# Patient Record
Sex: FEMALE | Race: WHITE
[De-identification: names, ages, dates, MRNs, and addresses within clinical notes are randomized per-mention and may not be internally consistent; named-entity substitution may affect disease eponyms.]

---

## 2018-01-09 ENCOUNTER — TRANSCRIPTION ENCOUNTER (OUTPATIENT)
Age: 58
End: 2018-01-09

## 2020-04-26 ENCOUNTER — MESSAGE (OUTPATIENT)
Age: 60
End: 2020-04-26

## 2020-05-12 ENCOUNTER — APPOINTMENT (OUTPATIENT)
Dept: DISASTER EMERGENCY | Facility: CLINIC | Age: 60
End: 2020-05-12

## 2020-05-12 LAB
SARS-COV-2 IGG SERPL IA-ACNC: <3.8 AU/ML
SARS-COV-2 IGG SERPL QL IA: NEGATIVE

## 2020-06-22 ENCOUNTER — EMERGENCY (EMERGENCY)
Facility: HOSPITAL | Age: 60
LOS: 0 days | Discharge: HOME | End: 2020-06-22
Attending: EMERGENCY MEDICINE
Payer: COMMERCIAL

## 2020-06-22 VITALS
HEART RATE: 82 BPM | SYSTOLIC BLOOD PRESSURE: 156 MMHG | DIASTOLIC BLOOD PRESSURE: 78 MMHG | RESPIRATION RATE: 18 BRPM | TEMPERATURE: 99 F | OXYGEN SATURATION: 99 %

## 2020-06-22 VITALS
HEART RATE: 82 BPM | WEIGHT: 199.96 LBS | SYSTOLIC BLOOD PRESSURE: 186 MMHG | TEMPERATURE: 98 F | DIASTOLIC BLOOD PRESSURE: 85 MMHG | RESPIRATION RATE: 17 BRPM | OXYGEN SATURATION: 98 % | HEIGHT: 67 IN

## 2020-06-22 DIAGNOSIS — M47.895 OTHER SPONDYLOSIS, THORACOLUMBAR REGION: ICD-10-CM

## 2020-06-22 DIAGNOSIS — M54.5 LOW BACK PAIN: ICD-10-CM

## 2020-06-22 DIAGNOSIS — K76.0 FATTY (CHANGE OF) LIVER, NOT ELSEWHERE CLASSIFIED: ICD-10-CM

## 2020-06-22 DIAGNOSIS — M48.061 SPINAL STENOSIS, LUMBAR REGION WITHOUT NEUROGENIC CLAUDICATION: ICD-10-CM

## 2020-06-22 DIAGNOSIS — N20.0 CALCULUS OF KIDNEY: ICD-10-CM

## 2020-06-22 DIAGNOSIS — R11.2 NAUSEA WITH VOMITING, UNSPECIFIED: ICD-10-CM

## 2020-06-22 DIAGNOSIS — M54.9 DORSALGIA, UNSPECIFIED: ICD-10-CM

## 2020-06-22 LAB
ALBUMIN SERPL ELPH-MCNC: 4.3 G/DL — SIGNIFICANT CHANGE UP (ref 3.3–5)
ALP SERPL-CCNC: 81 U/L — SIGNIFICANT CHANGE UP (ref 40–120)
ALT FLD-CCNC: 61 U/L — SIGNIFICANT CHANGE UP (ref 12–78)
ANION GAP SERPL CALC-SCNC: 8 MMOL/L — SIGNIFICANT CHANGE UP (ref 5–17)
APPEARANCE UR: CLEAR — SIGNIFICANT CHANGE UP
AST SERPL-CCNC: 44 U/L — HIGH (ref 15–37)
BASOPHILS # BLD AUTO: 0.03 K/UL — SIGNIFICANT CHANGE UP (ref 0–0.2)
BASOPHILS NFR BLD AUTO: 0.4 % — SIGNIFICANT CHANGE UP (ref 0–2)
BILIRUB SERPL-MCNC: 0.6 MG/DL — SIGNIFICANT CHANGE UP (ref 0.2–1.2)
BILIRUB UR-MCNC: NEGATIVE — SIGNIFICANT CHANGE UP
BUN SERPL-MCNC: 21 MG/DL — SIGNIFICANT CHANGE UP (ref 7–23)
CALCIUM SERPL-MCNC: 8.8 MG/DL — SIGNIFICANT CHANGE UP (ref 8.5–10.1)
CHLORIDE SERPL-SCNC: 106 MMOL/L — SIGNIFICANT CHANGE UP (ref 96–108)
CO2 SERPL-SCNC: 22 MMOL/L — SIGNIFICANT CHANGE UP (ref 22–31)
COLOR SPEC: YELLOW — SIGNIFICANT CHANGE UP
CREAT SERPL-MCNC: 0.86 MG/DL — SIGNIFICANT CHANGE UP (ref 0.5–1.3)
DIFF PNL FLD: ABNORMAL
EOSINOPHIL # BLD AUTO: 0.05 K/UL — SIGNIFICANT CHANGE UP (ref 0–0.5)
EOSINOPHIL NFR BLD AUTO: 0.7 % — SIGNIFICANT CHANGE UP (ref 0–6)
GLUCOSE SERPL-MCNC: 193 MG/DL — HIGH (ref 70–99)
GLUCOSE UR QL: 250 MG/DL
HCT VFR BLD CALC: 41.2 % — SIGNIFICANT CHANGE UP (ref 34.5–45)
HGB BLD-MCNC: 14 G/DL — SIGNIFICANT CHANGE UP (ref 11.5–15.5)
IMM GRANULOCYTES NFR BLD AUTO: 0.6 % — SIGNIFICANT CHANGE UP (ref 0–1.5)
KETONES UR-MCNC: ABNORMAL
LEUKOCYTE ESTERASE UR-ACNC: ABNORMAL
LYMPHOCYTES # BLD AUTO: 1.36 K/UL — SIGNIFICANT CHANGE UP (ref 1–3.3)
LYMPHOCYTES # BLD AUTO: 19.6 % — SIGNIFICANT CHANGE UP (ref 13–44)
MCHC RBC-ENTMCNC: 29.9 PG — SIGNIFICANT CHANGE UP (ref 27–34)
MCHC RBC-ENTMCNC: 34 GM/DL — SIGNIFICANT CHANGE UP (ref 32–36)
MCV RBC AUTO: 87.8 FL — SIGNIFICANT CHANGE UP (ref 80–100)
MONOCYTES # BLD AUTO: 0.25 K/UL — SIGNIFICANT CHANGE UP (ref 0–0.9)
MONOCYTES NFR BLD AUTO: 3.6 % — SIGNIFICANT CHANGE UP (ref 2–14)
NEUTROPHILS # BLD AUTO: 5.21 K/UL — SIGNIFICANT CHANGE UP (ref 1.8–7.4)
NEUTROPHILS NFR BLD AUTO: 75.1 % — SIGNIFICANT CHANGE UP (ref 43–77)
NITRITE UR-MCNC: NEGATIVE — SIGNIFICANT CHANGE UP
PH UR: 7 — SIGNIFICANT CHANGE UP (ref 5–8)
PLATELET # BLD AUTO: 205 K/UL — SIGNIFICANT CHANGE UP (ref 150–400)
POTASSIUM SERPL-MCNC: 4.4 MMOL/L — SIGNIFICANT CHANGE UP (ref 3.5–5.3)
POTASSIUM SERPL-SCNC: 4.4 MMOL/L — SIGNIFICANT CHANGE UP (ref 3.5–5.3)
PROT SERPL-MCNC: 8.2 GM/DL — SIGNIFICANT CHANGE UP (ref 6–8.3)
PROT UR-MCNC: 30 MG/DL
RBC # BLD: 4.69 M/UL — SIGNIFICANT CHANGE UP (ref 3.8–5.2)
RBC # FLD: 12.6 % — SIGNIFICANT CHANGE UP (ref 10.3–14.5)
SODIUM SERPL-SCNC: 136 MMOL/L — SIGNIFICANT CHANGE UP (ref 135–145)
SP GR SPEC: 1.01 — SIGNIFICANT CHANGE UP (ref 1.01–1.02)
UROBILINOGEN FLD QL: NEGATIVE MG/DL — SIGNIFICANT CHANGE UP
WBC # BLD: 6.94 K/UL — SIGNIFICANT CHANGE UP (ref 3.8–10.5)
WBC # FLD AUTO: 6.94 K/UL — SIGNIFICANT CHANGE UP (ref 3.8–10.5)

## 2020-06-22 PROCEDURE — 99285 EMERGENCY DEPT VISIT HI MDM: CPT

## 2020-06-22 PROCEDURE — 96375 TX/PRO/DX INJ NEW DRUG ADDON: CPT

## 2020-06-22 PROCEDURE — 74176 CT ABD & PELVIS W/O CONTRAST: CPT

## 2020-06-22 PROCEDURE — 81001 URINALYSIS AUTO W/SCOPE: CPT

## 2020-06-22 PROCEDURE — 74176 CT ABD & PELVIS W/O CONTRAST: CPT | Mod: 26

## 2020-06-22 PROCEDURE — 96376 TX/PRO/DX INJ SAME DRUG ADON: CPT

## 2020-06-22 PROCEDURE — 96374 THER/PROPH/DIAG INJ IV PUSH: CPT

## 2020-06-22 PROCEDURE — 85025 COMPLETE CBC W/AUTO DIFF WBC: CPT

## 2020-06-22 PROCEDURE — 80053 COMPREHEN METABOLIC PANEL: CPT

## 2020-06-22 PROCEDURE — 36415 COLL VENOUS BLD VENIPUNCTURE: CPT

## 2020-06-22 PROCEDURE — 99284 EMERGENCY DEPT VISIT MOD MDM: CPT | Mod: 25

## 2020-06-22 PROCEDURE — 87086 URINE CULTURE/COLONY COUNT: CPT

## 2020-06-22 RX ORDER — ONDANSETRON 8 MG/1
4 TABLET, FILM COATED ORAL ONCE
Refills: 0 | Status: COMPLETED | OUTPATIENT
Start: 2020-06-22 | End: 2020-06-22

## 2020-06-22 RX ORDER — SODIUM CHLORIDE 9 MG/ML
1000 INJECTION INTRAMUSCULAR; INTRAVENOUS; SUBCUTANEOUS ONCE
Refills: 0 | Status: COMPLETED | OUTPATIENT
Start: 2020-06-22 | End: 2020-06-22

## 2020-06-22 RX ORDER — KETOROLAC TROMETHAMINE 30 MG/ML
30 SYRINGE (ML) INJECTION ONCE
Refills: 0 | Status: DISCONTINUED | OUTPATIENT
Start: 2020-06-22 | End: 2020-06-22

## 2020-06-22 RX ORDER — CYCLOBENZAPRINE HYDROCHLORIDE 10 MG/1
1 TABLET, FILM COATED ORAL
Qty: 20 | Refills: 0
Start: 2020-06-22 | End: 2020-06-26

## 2020-06-22 RX ORDER — MORPHINE SULFATE 50 MG/1
4 CAPSULE, EXTENDED RELEASE ORAL ONCE
Refills: 0 | Status: DISCONTINUED | OUTPATIENT
Start: 2020-06-22 | End: 2020-06-22

## 2020-06-22 RX ADMIN — SODIUM CHLORIDE 1000 MILLILITER(S): 9 INJECTION INTRAMUSCULAR; INTRAVENOUS; SUBCUTANEOUS at 09:00

## 2020-06-22 RX ADMIN — ONDANSETRON 4 MILLIGRAM(S): 8 TABLET, FILM COATED ORAL at 10:35

## 2020-06-22 RX ADMIN — Medication 30 MILLIGRAM(S): at 08:59

## 2020-06-22 RX ADMIN — ONDANSETRON 4 MILLIGRAM(S): 8 TABLET, FILM COATED ORAL at 09:00

## 2020-06-22 RX ADMIN — MORPHINE SULFATE 4 MILLIGRAM(S): 50 CAPSULE, EXTENDED RELEASE ORAL at 10:35

## 2020-06-22 NOTE — ED PROVIDER NOTE - CHPI ED SYMPTOMS NEG
-- DO NOT REPLY / DO NOT REPLY ALL --  -- Message is from the [facility]--    Order Request  Immunization / Vaccine - Flu    Message / reason: Patient wants flu vaccine    Insurance type: [employer] HMO [place]  [facility] [place] [facility] [place] / Plan: [place]MO [place] [employer] [employer] [place] / Product Type: [place] Risk           Preferred Delivery Method   Call when ready for pickup - phone number to notify: [phone]     Caller Information       Type Contact Phone    03/07/2020 10:04 AM Phone (Incoming) Eran Ch (Self) [phone] (M)          Alternative phone number: none    Turnaround time given to caller:   \"This message will be sent to [state Provider's name]. The clinical team will fulfill your request as soon as they review your message when the office opens on Monday (or after the holiday).\"  
Left pt a vm to see if she is just coming for a flu shot at the time of her visit or will she be having a cpe that day with ben. Made pt apt for cpe with ben per pt  bradly. Waiting for call back to confirm which apt the pt will be coming in for.  
Pt needs to schedule visit in lab  Please verify that we still have supply of influenza vaccines    
no fever/no dysuria/no burning urination/no hematuria/no chills

## 2020-06-22 NOTE — ED PROVIDER NOTE - CARE PLAN
Principal Discharge DX:	Acute left-sided low back pain without sciatica  Secondary Diagnosis:	Spinal stenosis of lumbar region, unspecified whether neurogenic claudication present

## 2020-06-22 NOTE — ED ADULT NURSE NOTE - OBJECTIVE STATEMENT
PT to ED for c/o left flank pain that began yesterday, and worsened this morning. Denies radiation of pain. Rates pain 10/10. Reports nausea since yesterday and 1 bout of small vomit this morning on the way to the ED. PT a&ox4, ambulatory. Denies chest pain, sob, fever, chills. Denies difficulty and burning w/ urination. PT reports clear yellow urine. PT safe in room w/ call bell in reach. Labs sent. PT medicated for pain. Pending CT scan results. Will monitor and reassess.

## 2020-06-22 NOTE — ED PROVIDER NOTE - CARE PROVIDER_API CALL
Juan A Montesinos  ORTHOPAEDIC SURGERY  [address] [place] [address]  Phone: [phone]  Fax: [phone]  Follow Up Time:

## 2020-06-22 NOTE — ED PROVIDER NOTE - CLINICAL SUMMARY MEDICAL DECISION MAKING FREE TEXT BOX
60 yo overweight WF BIBA c/o'ing significant pain L CVA area, non-radiating, not exacerbated by movement, + urge to keep moving, no urine c/o's, + N/V PTA.  No F/C nor respiratory c/o's.  Pt in moderate discomfort due to pain, no focal CVAT.  Plan: labs incl. urine, IVF, IV Toradol/Zofran, CT [place].  Observe, reassess.

## 2020-06-22 NOTE — ED PROVIDER NOTE - OBJECTIVE STATEMENT
58 yo WF, denies PMH, on no meds, BIBA from home c/o'ing pain L CVA/flank area.  Onset last rj: + constant, aching/sharp, no radiation, not exacerbated by movement/position change/ambulation, + associated urgency to move in vain effort to reduce pain (no such alleviation).  + Associated N/V.  No F/C, dysuria, gross hematuria, abd pain, cp, SOB, cough.  No known COVID exposure.  Pt denies prior pains like this, no prior kidney stone hx.  PCP: Naseem.

## 2020-06-22 NOTE — ED PROVIDER NOTE - PATIENT PORTAL LINK FT
You can access the FollowMyHealth Patient Portal offered by [facility] by registering at the following website: [email or web address]. By joining [facility]’s FollowMyHealth portal, you will also be able to view your health information using other applications (apps) compatible with our system.

## 2020-06-22 NOTE — ED PROVIDER NOTE - PROGRESS NOTE DETAILS
Dr. Eden:  Pt reassessed: + symptomatic improvement, no focal CVAt on re-examination.  Pt presently lying back on ED stretcher in good comfort.  results of labs, CT d/w pt.  Doubt UTI as sample contaminated, will follow culture report.  CTA chest offered to further evaluate possible cause of her pain - pt declined, prefers D/c home at this time with po m. relaxant, few tabs pain meds & PCP/Spine office f/u this week.  pt expressed understanding that pain Dx has NOT been clearly delineated & agrees to return to ED if symptoms worsen before outp f/u.

## 2020-06-22 NOTE — ED PROVIDER NOTE - NSFOLLOWUPINSTRUCTIONS_ED_ALL_ED_FT
Avoid heavy lifting, physical exertion.  Drink plenty of oral fluids.  Follow up this week with spine doctor as listed below as well as your own regular doctor.  Return to ED if symptoms recur/worsen before you can follow up with PCP or Spine doctor, or if fever or other concern.  Do NOT take pain medicine & muscle relaxant at the same time, as each separately may cause drowsiness.      ED evaluation and management discussed with the patient and family (if available) in detail.  Close PMD follow up encouraged.  Strict ED return instructions discussed in detail and patient given the opportunity to ask any questions about their discharge diagnosis and instructions. Patient verbalized understanding.      Back Pain    Back pain is very common in adults. The cause of back pain is rarely dangerous and the pain often gets better over time. The cause of your back pain may not be known and may include strain of muscles or ligaments, degeneration of the spinal disks, or arthritis. Occasionally the pain may radiate down your leg(s). Over-the-counter medicines to reduce pain and inflammation are often the most helpful. Stretching and remaining active frequently helps the healing process.     SEEK IMMEDIATE MEDICAL CARE IF YOU HAVE ANY OF THE FOLLOWING SYMPTOMS: bowel or bladder control problems, unusual weakness or numbness in your arms or legs, nausea or vomiting, abdominal pain, fever, dizziness/lightheadedness.

## 2020-06-22 NOTE — ED PROVIDER NOTE - MUSCULOSKELETAL, MLM
Spine appears normal, range of motion is not limited, no muscle or joint tenderness.  HERBERT x 4.  Back: no focal tenderness nor swelling.  Normal gait.

## 2020-06-22 NOTE — ED PROVIDER NOTE - CARDIAC, MLM
Normal rate, regular rhythm.  Heart sounds S1, S2.  No murmurs, rubs or gallops.  normal radial pulse.

## 2020-06-22 NOTE — ED ADULT TRIAGE NOTE - CHIEF COMPLAINT QUOTE
Pt p/w c/o left flank pain which started last night.  Upon awakening this morning pt reports 8/10 left flank pain associated with nausea. Denies chest pain, cough, sob, hematuria, urinary s/s, hx of kidney stones.

## 2020-06-22 NOTE — ED PROVIDER NOTE - CONSTITUTIONAL, MLM
normal... Overweight WF, awake, alert, oriented to person, place, time/situation, + mild - moderate discomfort due to pain.  Unwilling to sit/lie down on ED stretcher, + pacing in ED room.

## 2020-06-23 LAB
CULTURE RESULTS: SIGNIFICANT CHANGE UP
SPECIMEN SOURCE: SIGNIFICANT CHANGE UP

## 2022-09-20 NOTE — ED ADULT NURSE NOTE - BREATH SOUNDS, MLM

## 2023-07-03 NOTE — ED PROVIDER NOTE - RESPIRATORY, MLM
Breath sounds clear and equal bilaterally.  Normal respirations. Detail Level: Zone Size Of Lesion In Cm (Optional): 0 Introduction Text (Please End With A Colon): The following procedure was deferred: Procedure To Be Performed At Next Visit: Liquid nitrogen